# Patient Record
Sex: FEMALE | Race: BLACK OR AFRICAN AMERICAN | NOT HISPANIC OR LATINO | Employment: OTHER | URBAN - METROPOLITAN AREA
[De-identification: names, ages, dates, MRNs, and addresses within clinical notes are randomized per-mention and may not be internally consistent; named-entity substitution may affect disease eponyms.]

---

## 2024-01-10 ENCOUNTER — HOSPITAL ENCOUNTER (EMERGENCY)
Facility: HOSPITAL | Age: 29
Discharge: HOME | End: 2024-01-11
Attending: INTERNAL MEDICINE
Payer: OTHER GOVERNMENT

## 2024-01-10 DIAGNOSIS — R07.9 CHEST PAIN: ICD-10-CM

## 2024-01-10 DIAGNOSIS — R09.1 PLEURISY: Primary | ICD-10-CM

## 2024-01-10 LAB
BASOPHILS # BLD AUTO: 0.03 K/UL (ref 0–0.2)
BASOPHILS NFR BLD: 0.5 % (ref 0–1.9)
DIFFERENTIAL METHOD BLD: ABNORMAL
EOSINOPHIL # BLD AUTO: 0.1 K/UL (ref 0–0.5)
EOSINOPHIL NFR BLD: 2.1 % (ref 0–8)
ERYTHROCYTE [DISTWIDTH] IN BLOOD BY AUTOMATED COUNT: 11.2 % (ref 11.5–14.5)
HCT VFR BLD AUTO: 40.1 % (ref 37–48.5)
HGB BLD-MCNC: 14 G/DL (ref 12–16)
IMM GRANULOCYTES # BLD AUTO: 0.01 K/UL (ref 0–0.04)
IMM GRANULOCYTES NFR BLD AUTO: 0.2 % (ref 0–0.5)
LYMPHOCYTES # BLD AUTO: 3.1 K/UL (ref 1–4.8)
LYMPHOCYTES NFR BLD: 54.8 % (ref 18–48)
MCH RBC QN AUTO: 31.1 PG (ref 27–31)
MCHC RBC AUTO-ENTMCNC: 34.9 G/DL (ref 32–36)
MCV RBC AUTO: 89 FL (ref 82–98)
MONOCYTES # BLD AUTO: 0.5 K/UL (ref 0.3–1)
MONOCYTES NFR BLD: 8.2 % (ref 4–15)
NEUTROPHILS # BLD AUTO: 1.9 K/UL (ref 1.8–7.7)
NEUTROPHILS NFR BLD: 34.2 % (ref 38–73)
NRBC BLD-RTO: 0 /100 WBC
PLATELET # BLD AUTO: 272 K/UL (ref 150–450)
PMV BLD AUTO: 9.5 FL (ref 9.2–12.9)
RBC # BLD AUTO: 4.5 M/UL (ref 4–5.4)
WBC # BLD AUTO: 5.6 K/UL (ref 3.9–12.7)

## 2024-01-10 PROCEDURE — 84484 ASSAY OF TROPONIN QUANT: CPT

## 2024-01-10 PROCEDURE — 80053 COMPREHEN METABOLIC PANEL: CPT

## 2024-01-10 PROCEDURE — 99285 EMERGENCY DEPT VISIT HI MDM: CPT | Mod: 25

## 2024-01-10 PROCEDURE — 85379 FIBRIN DEGRADATION QUANT: CPT

## 2024-01-10 PROCEDURE — 83880 ASSAY OF NATRIURETIC PEPTIDE: CPT

## 2024-01-10 PROCEDURE — 93010 ELECTROCARDIOGRAM REPORT: CPT | Mod: ,,, | Performed by: INTERNAL MEDICINE

## 2024-01-10 PROCEDURE — 85025 COMPLETE CBC W/AUTO DIFF WBC: CPT

## 2024-01-10 PROCEDURE — 93005 ELECTROCARDIOGRAM TRACING: CPT

## 2024-01-11 VITALS
HEIGHT: 68 IN | RESPIRATION RATE: 18 BRPM | OXYGEN SATURATION: 98 % | DIASTOLIC BLOOD PRESSURE: 55 MMHG | TEMPERATURE: 99 F | WEIGHT: 159 LBS | SYSTOLIC BLOOD PRESSURE: 103 MMHG | BODY MASS INDEX: 24.1 KG/M2 | HEART RATE: 64 BPM

## 2024-01-11 PROBLEM — R09.1 PLEURISY: Status: ACTIVE | Noted: 2024-01-11

## 2024-01-11 LAB
ALBUMIN SERPL BCP-MCNC: 3.6 G/DL (ref 3.5–5.2)
ALP SERPL-CCNC: 73 U/L (ref 55–135)
ALT SERPL W/O P-5'-P-CCNC: 16 U/L (ref 10–44)
ANION GAP SERPL CALC-SCNC: 11 MMOL/L (ref 8–16)
AST SERPL-CCNC: 19 U/L (ref 10–40)
BILIRUB SERPL-MCNC: 0.6 MG/DL (ref 0.1–1)
BNP SERPL-MCNC: <10 PG/ML (ref 0–99)
BUN SERPL-MCNC: 17 MG/DL (ref 6–20)
CALCIUM SERPL-MCNC: 8.9 MG/DL (ref 8.7–10.5)
CHLORIDE SERPL-SCNC: 103 MMOL/L (ref 95–110)
CO2 SERPL-SCNC: 25 MMOL/L (ref 23–29)
CREAT SERPL-MCNC: 0.7 MG/DL (ref 0.5–1.4)
D DIMER PPP IA.FEU-MCNC: 0.36 MG/L FEU
EST. GFR  (NO RACE VARIABLE): >60 ML/MIN/1.73 M^2
GLUCOSE SERPL-MCNC: 82 MG/DL (ref 70–110)
POTASSIUM SERPL-SCNC: 3.9 MMOL/L (ref 3.5–5.1)
PROT SERPL-MCNC: 7.2 G/DL (ref 6–8.4)
SODIUM SERPL-SCNC: 139 MMOL/L (ref 136–145)
TROPONIN I SERPL DL<=0.01 NG/ML-MCNC: <0.006 NG/ML (ref 0–0.03)

## 2024-01-11 PROCEDURE — 96374 THER/PROPH/DIAG INJ IV PUSH: CPT

## 2024-01-11 PROCEDURE — 63600175 PHARM REV CODE 636 W HCPCS: Performed by: INTERNAL MEDICINE

## 2024-01-11 RX ORDER — IBUPROFEN 600 MG/1
600 TABLET ORAL 3 TIMES DAILY
Qty: 30 TABLET | Refills: 0 | Status: SHIPPED | OUTPATIENT
Start: 2024-01-11

## 2024-01-11 RX ORDER — KETOROLAC TROMETHAMINE 30 MG/ML
30 INJECTION, SOLUTION INTRAMUSCULAR; INTRAVENOUS
Status: COMPLETED | OUTPATIENT
Start: 2024-01-11 | End: 2024-01-11

## 2024-01-11 RX ADMIN — KETOROLAC TROMETHAMINE 30 MG: 30 INJECTION, SOLUTION INTRAMUSCULAR; INTRAVENOUS at 02:01

## 2024-01-11 NOTE — ED PROVIDER NOTES
Encounter Date: 1/10/2024    SCRIBE #1 NOTE: I, Michael Roper, am scribing for, and in the presence of,  Jeovanny Pierce MD.       History     Chief Complaint   Patient presents with    Chest Pain    Shortness of Breath     Pt presents to ED c/o mid sternal chest pain non radiating started yesterday.  Pt also c/o sob.  Denies any other symptoms.  Pt reports having hysterectomy on 01/08/23, no complaints from surgery at this time.  Pain 6/10.  Denies cardiac hx.       Jazzy Gleason is a 28 y.o. female with a history of hysterectomy, who presents to the ED for evaluation of chest pain beginning yesterday. Patient reports complaints of mid-sternal chest pain worsened by deep inhalation for 2 days. She notes she recently underwent a hysterectomy. No medications taken PTA. No alleviating or exacerbating factors noted. Denies N/V/D, abdominal pain, or other associated symptoms. NKDA.     The history is provided by the patient. No  was used.     Review of patient's allergies indicates:  No Known Allergies  No past medical history on file.  No past surgical history on file.  No family history on file.     Review of Systems   Constitutional:  Negative for fever.   HENT:  Negative for sore throat.    Respiratory:  Negative for shortness of breath.    Cardiovascular:  Positive for chest pain.   Gastrointestinal:  Negative for abdominal pain, diarrhea, nausea and vomiting.   Genitourinary:  Negative for dysuria.   Musculoskeletal:  Negative for back pain.   Skin:  Negative for rash.   Neurological:  Negative for weakness and headaches.   Psychiatric/Behavioral:  Negative for behavioral problems.    All other systems reviewed and are negative.      Physical Exam     Initial Vitals [01/10/24 2156]   BP Pulse Resp Temp SpO2   132/84 76 18 98.6 °F (37 °C) 99 %      MAP       --         Physical Exam    Nursing note and vitals reviewed.  Constitutional: She appears well-developed and well-nourished.   HENT:    Head: Normocephalic and atraumatic.   Eyes: Conjunctivae are normal.   Neck: Neck supple.   Normal range of motion.  Cardiovascular:  Normal rate, regular rhythm and normal heart sounds.     Exam reveals no gallop and no friction rub.       No murmur heard.  Pulmonary/Chest: Breath sounds normal. No respiratory distress. She has no wheezes. She has no rhonchi. She has no rales. She exhibits no tenderness.   Abdominal: Abdomen is soft. There is no abdominal tenderness.   Healing surgical incisional wounds to the abdomen.    Musculoskeletal:         General: No edema. Normal range of motion.      Cervical back: Normal range of motion and neck supple.     Neurological: She is alert and oriented to person, place, and time. GCS score is 15. GCS eye subscore is 4. GCS verbal subscore is 5. GCS motor subscore is 6.   Skin: Skin is warm and dry.   Psychiatric: She has a normal mood and affect.         ED Course   Procedures  Labs Reviewed   CBC W/ AUTO DIFFERENTIAL - Abnormal; Notable for the following components:       Result Value    MCH 31.1 (*)     RDW 11.2 (*)     Gran % 34.2 (*)     Lymph % 54.8 (*)     All other components within normal limits   TROPONIN I   B-TYPE NATRIURETIC PEPTIDE   COMPREHENSIVE METABOLIC PANEL   D DIMER, QUANTITATIVE     EKG Readings: (Independently Interpreted)   Sinus rhythm. Rate of 70. No STEMI. Q-waves in V1 and V2.        Imaging Results              CTA Chest Non-Coronary (PE Studies) (Final result)  Result time 01/11/24 01:31:13      Final result by Estrella Montiel MD (01/11/24 01:31:13)                   Impression:      1. No acute intrathoracic abnormalities identified.  No evidence of PE.  2. Intraperitoneal free air status post recent hysterectomy.  Volume of free air is greater than would typically be expected in 2-3 days postop setting.  Unfortunately potential viscus perforation is unable to be excluded.  3. No additional acute intra-abdominal abnormalities identified.  No  significant free fluid seen.  No focal fluid collection or rim enhancing abscess identified.      Electronically signed by: Estrella Montiel MD  Date:    01/11/2024  Time:    01:31               Narrative:    EXAMINATION:  CTA CHEST NON CORONARY (PE STUDIES); CT ABDOMEN PELVIS WITH IV CONTRAST    CLINICAL HISTORY:  Pulmonary embolism (PE) suspected, unknown D-dimer;; Peritonitis or perforation suspected;    TECHNIQUE:  CTA chest and CT abdomen and pelvis were obtained following administration of 80 cc Omnipaque 350 IV contrast.  Sagittal and coronal reformats were obtained.    COMPARISON:  None    FINDINGS:  Aorta is nonaneurysmal.  Heart is normal in size with no pericardial effusion.  No evidence of pulmonary thromboembolism.  No significant abnormalities are seen along the esophageal course.  Major airways are patent.  Lungs are symmetrically expanded.  No evidence of focal consolidation, pleural effusion, pulmonary hemorrhage, or infarction.    No significant hepatic abnormalities are identified.  There is no intra-or extrahepatic biliary ductal dilatation.  The gallbladder is unremarkable.  The stomach, pancreas, spleen, and adrenal glands are unremarkable.    Kidneys are functioning with no evidence of hydronephrosis.  Ureters are difficult to track.  Urinary bladder is nondistended.  Uterus has been removed.  No significant adnexal abnormalities are seen.    There is moderate volume intraperitoneal free air.  No significant free fluid is seen.  No focal fluid collection or rim enhancing abscess seen.  Appendix is visualized and is unremarkable.  No evidence of bowel obstruction.    Aorta tapers normally.    No acute osseous abnormality identified.    Expected postsurgical changes are seen involving the lower anterior abdominal wall.  No focal fluid collection or abscess seen.                                       CT Abdomen Pelvis With IV Contrast NO Oral Contrast (Final result)  Result time 01/11/24 01:31:13       Final result by Estrella Montiel MD (01/11/24 01:31:13)                   Impression:      1. No acute intrathoracic abnormalities identified.  No evidence of PE.  2. Intraperitoneal free air status post recent hysterectomy.  Volume of free air is greater than would typically be expected in 2-3 days postop setting.  Unfortunately potential viscus perforation is unable to be excluded.  3. No additional acute intra-abdominal abnormalities identified.  No significant free fluid seen.  No focal fluid collection or rim enhancing abscess identified.      Electronically signed by: Estrella Montiel MD  Date:    01/11/2024  Time:    01:31               Narrative:    EXAMINATION:  CTA CHEST NON CORONARY (PE STUDIES); CT ABDOMEN PELVIS WITH IV CONTRAST    CLINICAL HISTORY:  Pulmonary embolism (PE) suspected, unknown D-dimer;; Peritonitis or perforation suspected;    TECHNIQUE:  CTA chest and CT abdomen and pelvis were obtained following administration of 80 cc Omnipaque 350 IV contrast.  Sagittal and coronal reformats were obtained.    COMPARISON:  None    FINDINGS:  Aorta is nonaneurysmal.  Heart is normal in size with no pericardial effusion.  No evidence of pulmonary thromboembolism.  No significant abnormalities are seen along the esophageal course.  Major airways are patent.  Lungs are symmetrically expanded.  No evidence of focal consolidation, pleural effusion, pulmonary hemorrhage, or infarction.    No significant hepatic abnormalities are identified.  There is no intra-or extrahepatic biliary ductal dilatation.  The gallbladder is unremarkable.  The stomach, pancreas, spleen, and adrenal glands are unremarkable.    Kidneys are functioning with no evidence of hydronephrosis.  Ureters are difficult to track.  Urinary bladder is nondistended.  Uterus has been removed.  No significant adnexal abnormalities are seen.    There is moderate volume intraperitoneal free air.  No significant free fluid is seen.  No focal  fluid collection or rim enhancing abscess seen.  Appendix is visualized and is unremarkable.  No evidence of bowel obstruction.    Aorta tapers normally.    No acute osseous abnormality identified.    Expected postsurgical changes are seen involving the lower anterior abdominal wall.  No focal fluid collection or abscess seen.                                        X-Ray Chest PA And Lateral (Final result)  Result time 01/10/24 22:12:40      Final result by Estrella Montiel MD (01/10/24 22:12:40)                   Impression:      Subdiaphragmatic free air concerning for perforation.  No provided history or recent surgical history is available.    Otherwise no acute cardiopulmonary process identified.    This report was flagged in Epic as abnormal.      Electronically signed by: Estrella Montiel MD  Date:    01/10/2024  Time:    22:12               Narrative:    EXAMINATION:  XR CHEST PA AND LATERAL    CLINICAL HISTORY:  Chest pain, unspecified    TECHNIQUE:  PA and lateral views of the chest were performed.    COMPARISON:  None    FINDINGS:  Cardiac silhouette is normal in size.  Lungs are symmetrically expanded.  No evidence of focal consolidative process, pneumothorax, or significant pleural effusion.  No acute osseous abnormality identified.  Subdiaphragmatic free air seen beneath the hemidiaphragms.                                       Medications   ketorolac injection 30 mg (30 mg Intravenous Given 1/11/24 0205)     Medical Decision Making  Jazzy Gleason is a 28 y.o. female with a history of hysterectomy, who presents to the ED for evaluation of chest pain beginning yesterday. Patient reports complaints of mid-sternal chest pain worsened by deep inhalation for 2 days. She notes she recently underwent a hysterectomy. No medications taken PTA. No alleviating or exacerbating factors noted. Denies N/V/D, abdominal pain, or other associated symptoms. NKDA.   Course of ED stay:  Chest discomfort was relieved  by Toradol in the ED. EKG showed no acute changes and troponin was normal.  Chest x-ray showed no acute cardiopulmonary disease, but revealed free air under the diaphragm.  CT of abdomen and pelvis was ordered which reveals evidence of free air, but no evidence of perforated viscus.  CTA of chest showed no evidence of pulmonary embolus or acute disease.  Patient denied abdominal pain throughout ED stay and was counseled on CT findings of free air under the diaphragm.  She states she would like to follow-up with her primary care physician and forego any further evaluation of the abdomen at this time.  She was given instructions to follow-up with her PCP within the next 2 days for re-evaluation/return to the emergency department if condition worsens.  Instructions were also given for pleurisy and patient received a prescription for ibuprofen.    Amount and/or Complexity of Data Reviewed  Labs: ordered.  Radiology: ordered.  ECG/medicine tests: ordered and independent interpretation performed.     Details: EKGs independently interpreted by Jeovanny Pierce MD reads: see prior notes.       Risk  Prescription drug management.            Scribe Attestation:   Scribe #1: I performed the above scribed service and the documentation accurately describes the services I performed. I attest to the accuracy of the note.                             This document was produced by a scribe under my direction and in my presence. I agree with the content of the note and have made any necessary edits.     Dr. Pierce    01/11/2024 6:53 AM      Clinical Impression:  Final diagnoses:  [R07.9] Chest pain  [R09.1] Pleurisy (Primary)          ED Disposition Condition    Discharge Stable          ED Prescriptions       Medication Sig Dispense Start Date End Date Auth. Provider    ibuprofen (ADVIL,MOTRIN) 600 MG tablet Take 1 tablet (600 mg total) by mouth 3 (three) times daily. 30 tablet 1/11/2024 -- Jeovanny Pierce MD          Follow-up  Information    None          Jeovanny Pierce MD  01/11/24 0650

## 2024-01-11 NOTE — ED NOTES
Pt presents to ED c/o CP and HA since yesterday. Pt reports pain with talking. PT denies SOB, nausea, and vomiting, Pt had recent hysterectomy